# Patient Record
(demographics unavailable — no encounter records)

---

## 2024-10-15 NOTE — DISCUSSION/SUMMARY
[FreeTextEntry1] : Advised to take Amlodipine in AM and Nortriptyline in evening to try to mitigate potential interaction. Contin home BP monitoring Call or message in 1 week with update. Consider reducing dose of amlodipine and adding small dose of ARB.

## 2024-10-15 NOTE — HISTORY OF PRESENT ILLNESS
[FreeTextEntry1] : Compl of recent labile BP with more elevation to 150s/90s at times.  HR also increased for past couple of weeks. + intermittent palps when HR is higher. She takes both amlodipine and Nortriptyline in evening.

## 2024-12-18 NOTE — REASON FOR VISIT
[Consultation] : a consultation visit [Home] : at home, [unfilled] , at the time of the visit. [Medical Office: ([facility])___] : at the medical office located in  [Patient] : the patient [Self] : self

## 2024-12-18 NOTE — ASSESSMENT
[FreeTextEntry1] : This is a pleasant 47-year-old female who requested a telehealth consult for screening colonoscopy. She has a history of hypothyroidism, headaches, HTN, intermittent palpitations when her HR is elevated. Patient has been following up with cardiologist Dr. Mejia. She denies ever having a lower GI evaluation before. She denies any family history of colon cancer or polyps. She denies any change in bowel habits. She denies any diarrhea or constipation. She denies any rectal bleeding or melena. She denies any abdominal pain, unintentional weight loss or history of anemia. She denies any upper GI symptoms.    Mrs. Coronado is an appropriate candidate for procedure. I explained to her the risks, alternatives, and benefits to a colonoscopy. Risk including but not limited to bleeding, perforation, infection and adverse medication reaction. Discussed with patient that she will need cardiac clearance due to her liable B/P and intermittent palpitation. I informed her that I will be sending a prescription for colon prep Clenpiq to the pharmacy on file. I also discussed with her diet and colon prep instructions prior to the procedure. All instruction will be sent to the patient. Patient advised to take her levothyroxine and blood pressure medication with a sip of water the morning of her procedure. Questions were answered. She stated understanding to the above and is agreeable to proceed with the planned procedure. Patient advised to call back the office if Clenpiq is not covered by her insurance so that an alternative can be prescribed, or with any further questions or concerns.

## 2024-12-18 NOTE — HISTORY OF PRESENT ILLNESS
[FreeTextEntry1] : This is a pleasant 47-year-old female who requested a telehealth consult for screening colonoscopy. She has a history of hypothyroidism, headaches, HTN, intermittent palpitations when her HR is elevated. Patient has been following up with cardiologist Dr. Mejia. She denies ever having a lower GI evaluation before. She denies any family history of colon cancer or polyps. She denies any change in bowel habits. She denies any diarrhea or constipation. She denies any rectal bleeding or melena. She denies any abdominal pain, unintentional weight loss or history of anemia. She denies any upper GI symptoms.

## 2025-03-21 NOTE — HISTORY OF PRESENT ILLNESS
[FreeTextEntry1] : Follow up. Doing better. Palps improved. BP mostly good. Walking for exercise Recent labs from PCP show mildly elev .

## 2025-03-21 NOTE — DISCUSSION/SUMMARY
[FreeTextEntry1] : Advised diet, wt loss, contin exercise. Provided educational material re lowering cholesterol through diet. Follow up 6 mo Contin amlodipine for BP.